# Patient Record
Sex: MALE | Race: OTHER
[De-identification: names, ages, dates, MRNs, and addresses within clinical notes are randomized per-mention and may not be internally consistent; named-entity substitution may affect disease eponyms.]

---

## 2017-03-02 ENCOUNTER — HOSPITAL ENCOUNTER (OUTPATIENT)
Dept: HOSPITAL 53 - M SMT | Age: 77
End: 2017-03-02
Attending: NURSE PRACTITIONER
Payer: MEDICARE

## 2017-03-02 DIAGNOSIS — R97.20: Primary | ICD-10-CM

## 2017-03-04 LAB — PSA SERPL-MCNC: 2.9 NG/ML (ref 0–4)

## 2017-08-25 ENCOUNTER — HOSPITAL ENCOUNTER (OUTPATIENT)
Dept: HOSPITAL 53 - M SMT | Age: 77
End: 2017-08-25
Attending: NURSE PRACTITIONER
Payer: MEDICARE

## 2017-08-25 DIAGNOSIS — R97.20: Primary | ICD-10-CM

## 2017-10-27 ENCOUNTER — HOSPITAL ENCOUNTER (OUTPATIENT)
Dept: HOSPITAL 53 - M SMT PRO | Age: 77
End: 2017-10-27
Attending: UROLOGY
Payer: MEDICARE

## 2017-10-27 DIAGNOSIS — C61: Primary | ICD-10-CM

## 2017-10-27 PROCEDURE — 76942 ECHO GUIDE FOR BIOPSY: CPT

## 2017-10-27 PROCEDURE — 55700: CPT

## 2017-10-27 PROCEDURE — 76872 US TRANSRECTAL: CPT

## 2017-10-27 NOTE — REP
TRANSRECTAL ULTRASOUND OF THE PROSTATE WITH ULTRASOUND GUIDANCE FOR PROSTATE

BIOPSY:

 

Real-time sonographic of the prostate performed utilized transrectal probe.  Size

of the gland is 4.5 x 3.8 x 5.0 cm for a total volume of 45.3 mL.  Echotexture is

diffusely heterogeneous.  On the right there are three nodular hypoechoic areas

identified measuring 11 x 8,  7 x 6  and 10 x 9 mm.  On the left two nodular

areas are seen measuring 12 x 9 and 6 x 4 mm.  Seminal vesicles appear

symmetrical.

 

Ultrasound guidance was provided for Dr. Biswas who performed ultrasound

guided biopsy of the prostate.

 

 

Signed by

Rey Knowles MD 10/27/2017 05:49 P

## 2017-11-15 ENCOUNTER — HOSPITAL ENCOUNTER (OUTPATIENT)
Dept: HOSPITAL 53 - M LAB REF | Age: 77
End: 2017-11-15
Attending: SURGERY
Payer: MEDICARE

## 2017-11-15 DIAGNOSIS — C43.59: Primary | ICD-10-CM

## 2017-11-15 DIAGNOSIS — L57.0: ICD-10-CM

## 2017-11-15 DIAGNOSIS — C44.519: ICD-10-CM

## 2017-11-28 ENCOUNTER — HOSPITAL ENCOUNTER (INPATIENT)
Dept: HOSPITAL 53 - M OR | Age: 77
LOS: 1 days | Discharge: HOME | DRG: 708 | End: 2017-11-29
Attending: UROLOGY | Admitting: UROLOGY
Payer: MEDICARE

## 2017-11-28 VITALS — SYSTOLIC BLOOD PRESSURE: 136 MMHG | DIASTOLIC BLOOD PRESSURE: 78 MMHG

## 2017-11-28 VITALS — SYSTOLIC BLOOD PRESSURE: 131 MMHG | DIASTOLIC BLOOD PRESSURE: 76 MMHG

## 2017-11-28 VITALS — DIASTOLIC BLOOD PRESSURE: 87 MMHG | SYSTOLIC BLOOD PRESSURE: 139 MMHG

## 2017-11-28 VITALS — DIASTOLIC BLOOD PRESSURE: 78 MMHG | SYSTOLIC BLOOD PRESSURE: 164 MMHG

## 2017-11-28 VITALS — HEIGHT: 70 IN | WEIGHT: 184 LBS | BODY MASS INDEX: 26.34 KG/M2

## 2017-11-28 VITALS — DIASTOLIC BLOOD PRESSURE: 73 MMHG | SYSTOLIC BLOOD PRESSURE: 152 MMHG

## 2017-11-28 VITALS — SYSTOLIC BLOOD PRESSURE: 150 MMHG | DIASTOLIC BLOOD PRESSURE: 80 MMHG

## 2017-11-28 DIAGNOSIS — C61: Primary | ICD-10-CM

## 2017-11-28 DIAGNOSIS — E78.5: ICD-10-CM

## 2017-11-28 DIAGNOSIS — Z87.442: ICD-10-CM

## 2017-11-28 DIAGNOSIS — Z88.1: ICD-10-CM

## 2017-11-28 DIAGNOSIS — Z79.899: ICD-10-CM

## 2017-11-28 DIAGNOSIS — Z88.5: ICD-10-CM

## 2017-11-28 DIAGNOSIS — Z87.891: ICD-10-CM

## 2017-11-28 DIAGNOSIS — G43.909: ICD-10-CM

## 2017-11-28 DIAGNOSIS — I10: ICD-10-CM

## 2017-11-28 DIAGNOSIS — Z88.8: ICD-10-CM

## 2017-11-28 LAB
ANION GAP SERPL CALC-SCNC: 7 MEQ/L (ref 8–16)
BUN SERPL-MCNC: 15 MG/DL (ref 7–18)
CALCIUM SERPL-MCNC: 8.4 MG/DL (ref 8.8–10.2)
CHLORIDE SERPL-SCNC: 107 MEQ/L (ref 98–107)
CO2 SERPL-SCNC: 26 MEQ/L (ref 21–32)
CREAT SERPL-MCNC: 1.48 MG/DL (ref 0.7–1.3)
ERYTHROCYTE [DISTWIDTH] IN BLOOD BY AUTOMATED COUNT: 13.5 % (ref 11.5–14.5)
GFR SERPL CREATININE-BSD FRML MDRD: 49.3 ML/MIN/{1.73_M2} (ref 42–?)
GLUCOSE SERPL-MCNC: 142 MG/DL (ref 83–110)
MCH RBC QN AUTO: 34.4 PG (ref 27–33)
MCHC RBC AUTO-ENTMCNC: 33.7 G/DL (ref 32–36.5)
MCV RBC AUTO: 102.1 FL (ref 80–96)
NRBC BLD AUTO-RTO: 0 % (ref 0–0)
PLATELET # BLD AUTO: 230 10^3/UL (ref 150–450)
POTASSIUM SERPL-SCNC: 4.5 MEQ/L (ref 3.5–5.1)
SODIUM SERPL-SCNC: 140 MEQ/L (ref 136–145)
WBC # BLD AUTO: 8.3 10^3/UL (ref 4–10)

## 2017-11-28 PROCEDURE — 8E0W4CZ ROBOTIC ASSISTED PROCEDURE OF TRUNK REGION, PERCUTANEOUS ENDOSCOPIC APPROACH: ICD-10-PCS | Performed by: UROLOGY

## 2017-11-28 PROCEDURE — 07BC4ZZ EXCISION OF PELVIS LYMPHATIC, PERCUTANEOUS ENDOSCOPIC APPROACH: ICD-10-PCS | Performed by: UROLOGY

## 2017-11-28 PROCEDURE — 0VT04ZZ RESECTION OF PROSTATE, PERCUTANEOUS ENDOSCOPIC APPROACH: ICD-10-PCS | Performed by: UROLOGY

## 2017-11-28 RX ADMIN — ACETAMINOPHEN SCH MG: 650 TABLET, FILM COATED, EXTENDED RELEASE ORAL at 21:40

## 2017-11-28 RX ADMIN — FENTANYL CITRATE PRN MCG: 50 INJECTION, SOLUTION INTRAMUSCULAR; INTRAVENOUS at 18:08

## 2017-11-28 RX ADMIN — KETOROLAC TROMETHAMINE SCH MG: 30 INJECTION, SOLUTION INTRAMUSCULAR at 20:11

## 2017-11-28 RX ADMIN — SULFAMETHOXAZOLE AND TRIMETHOPRIM SCH TAB: 800; 160 TABLET ORAL at 21:39

## 2017-11-28 RX ADMIN — POTASSIUM CHLORIDE, DEXTROSE MONOHYDRATE AND SODIUM CHLORIDE SCH MLS/HR: 150; 5; 450 INJECTION, SOLUTION INTRAVENOUS at 20:14

## 2017-11-28 RX ADMIN — FENTANYL CITRATE PRN MCG: 50 INJECTION, SOLUTION INTRAMUSCULAR; INTRAVENOUS at 17:38

## 2017-11-28 RX ADMIN — FENTANYL CITRATE PRN MCG: 50 INJECTION, SOLUTION INTRAMUSCULAR; INTRAVENOUS at 17:58

## 2017-11-28 RX ADMIN — FENTANYL CITRATE PRN MCG: 50 INJECTION, SOLUTION INTRAMUSCULAR; INTRAVENOUS at 17:43

## 2017-11-28 NOTE — RO
DATE OF PROCEDURE:  11/28/2017

 

PREOPERATIVE DIAGNOSIS:  Prostate cancer.

 

POSTOPERATIVE DIAGNOSIS:  Prostate cancer.

 

SURGERY PERFORMED:  Robotic-assisted radical prostatectomy plus bilateral pelvic

lymph node dissections.

 

SURGEON:  Qasim Biswas MD

 

FIRST ASSISTANT:  Jeanette Buckley

 

ANESTHESIA:  General.

 

COMPLICATIONS:  None.

 

ESTIMATED BLOOD LOSS:  75 mL.

 

HISTORY OF PRESENT ILLNESS:  This is a 77-year-old male patient that has a

clinical history of prostate cancer, [place] 7, clinical stage [id number].  He has

consented for robotic-assisted radical prostatectomy, plus bilateral pelvic lymph

node dissections.

 

PROCEDURE DESCRIPTION:  In a patient under general anesthesia in supine position,

modified position, after prepping and draping the area of concern, which included

the entire genitalia and abdomen, we introduced a #16-Ghanaian Castro catheter

filled the balloon to 10 mL and placed it to gravity. We then performed an

incision infraumbilically for about 2 cm in length, vertically in the midline.

Through this incision, we opened the aponeurosis of the rectal fascia for about 2

cm and with finger dissection we dissected the Retzius space. We then placed a

balloon SpaceMaker to dissect further on the Retzius space. We then placed a 12

mm balloon trocar and inflated the balloon to 40 mL and placed CO2 at a maximum

pressure of 15 on high flow. We placed a hand-held robotic camera 0 degrees, to

place the other trocars in a fan-shape manner. Two 8 mm metallic trocars

 by each other by 8 cm were placed on the right side and two other

trocars, a 12 mm VersaStep  in a midclavicular line, 16 cm away from the

midclavicular line and 8 cm away from this one another trocar on the left side an

8 mm metallic trocar. We then positioned the patient in steep Trendelenburg

position and docked the robot.

 

On the left arm we used monopolar scissors and on the right arm we used  a

ProGrasp and bipolar PK.   We then proceeded to actually dissect the

periprostatic fat, uncovered the prostate and identified the endopelvic fascia

and opened the endopelvic fascia from base to apex. We cut the puboprostatic

ligaments and then secured the dorsal vein complex with CT-1 needle #0 Vicryl

times two. We then proceeded to cut the anterior bladder neck with Monopolar

scissors and then opened the bladder and identified the Castro catheter, deflated

the balloon and grabbed the Castro catheter with the third arm and pulled the

prostate into traction anteriorly by pulling on the Castro catheter with the third

arm. We then cut the posterior bladder neck, posterior to the detrusor muscle and

then dissected the vas deferens and cut them and dissected the seminal vesicles

bilaterally and grabbed them with the third arm to pull into traction the

posterior Denonvilliers fascia. We then cut the posterior Denonvilliers fascia

and dissected the rectum away from the prostate gland from base to apex. With

extra-large Hem-o-rachel clipper pliers we actually secured the prostatic pedicles

on the left side times two, on the right side times two. In an anterior fascial

approach we dissected the prostate from base to apex. We then proceeded to cut

the dorsal vein complex, cut the urethra and place the prostate and seminal

vesicles into a 10 mm Endo Catch bag. We then proceeded to actually do a

posterior Denonvillier reconstruction, Clifton stitch with a V-Loc #3-0 in a

running fashion. We then proceeded to do our urethrovesical anastomosis with a

double-arm absorbable barbed suture Quill stitch, #2-0. We started at 6-o'clock

in the bladder neck, outside in and inside out and urethra running 360 degrees

across around the bladder neck and placed 20 mL Castro catheter inflatable balloon

to 20 ml, tied the knots, took the needles out. We tested our anastomosis and was

water-tight.

 

We then proceeded to do our pelvic lymph node dissections on the left side and

the right side. Our limits of dissection were superiorly the hypogastric

bifurcation with hypogastric artery, laterally the iliac artery, posteriorly the

pelvic sidewall, medially the bladder and inferiorly the lacunar ligament. We

dissected the external iliac vein lymph nodes, obturator lymph nodes, hypogastric

lymph nodes. We secured hemostasis with Hem-o-loks and bipolar PK. We performed

the same dissection on the right side. Each specimen was sent into permanent

pathology analysis in a 10 mm Endo Catch bag labeled as left pelvic lymph node

dissection, right pelvic lymph node dissection.

 

We then proceeded to take out the third arm and placed a round DONY drain, #15

blade and placed into the pelvis. We then proceeded undock the robot take all the

instruments out, undock the robot and take all the trocars out. We took the

specimens through the midline incision, prostate and seminal vesicles, then left

pelvic lymph node dissection, right pelvic lymph node dissection all sent to

permanent pathology analysis.

 

The estimated blood loss was 75 mL.  We then proceeded to close the incision in

the midline with #2-0 Vicryl in a running fashion with UR6 needle and then skin

was closed with #4-0 Monocryl subcuticular stitches. We placed Mastisol,

Steri-Strips, telfa and Tegaderm on top of each incision. The DONY was placed to

bulb suction. The Castro catheter was placed to gravity.

 

PLAN:  The patient will pass through recovery room and then to the floor.  Once

he is tolerating a regular diet, ambulating very well he will be discharged home

with a Castro catheter.

## 2017-11-29 VITALS — DIASTOLIC BLOOD PRESSURE: 76 MMHG | SYSTOLIC BLOOD PRESSURE: 131 MMHG

## 2017-11-29 VITALS — DIASTOLIC BLOOD PRESSURE: 70 MMHG | SYSTOLIC BLOOD PRESSURE: 123 MMHG

## 2017-11-29 VITALS — DIASTOLIC BLOOD PRESSURE: 81 MMHG | SYSTOLIC BLOOD PRESSURE: 144 MMHG

## 2017-11-29 VITALS — SYSTOLIC BLOOD PRESSURE: 127 MMHG | DIASTOLIC BLOOD PRESSURE: 69 MMHG

## 2017-11-29 VITALS — SYSTOLIC BLOOD PRESSURE: 150 MMHG | DIASTOLIC BLOOD PRESSURE: 85 MMHG

## 2017-11-29 LAB
ANION GAP SERPL CALC-SCNC: 5 MEQ/L (ref 8–16)
BUN SERPL-MCNC: 11 MG/DL (ref 7–18)
CALCIUM SERPL-MCNC: 8.2 MG/DL (ref 8.8–10.2)
CHLORIDE SERPL-SCNC: 108 MEQ/L (ref 98–107)
CO2 SERPL-SCNC: 29 MEQ/L (ref 21–32)
CREAT SERPL-MCNC: 1.1 MG/DL (ref 0.7–1.3)
ERYTHROCYTE [DISTWIDTH] IN BLOOD BY AUTOMATED COUNT: 13.7 % (ref 11.5–14.5)
GFR SERPL CREATININE-BSD FRML MDRD: > 60 ML/MIN/{1.73_M2} (ref 42–?)
GLUCOSE SERPL-MCNC: 130 MG/DL (ref 83–110)
MCH RBC QN AUTO: 34.3 PG (ref 27–33)
MCHC RBC AUTO-ENTMCNC: 33.7 G/DL (ref 32–36.5)
MCV RBC AUTO: 101.5 FL (ref 80–96)
NRBC BLD AUTO-RTO: 0 % (ref 0–0)
PLATELET # BLD AUTO: 200 10^3/UL (ref 150–450)
POTASSIUM SERPL-SCNC: 4.6 MEQ/L (ref 3.5–5.1)
SODIUM SERPL-SCNC: 142 MEQ/L (ref 136–145)
WBC # BLD AUTO: 8.2 10^3/UL (ref 4–10)

## 2017-11-29 RX ADMIN — POTASSIUM CHLORIDE, DEXTROSE MONOHYDRATE AND SODIUM CHLORIDE SCH MLS/HR: 150; 5; 450 INJECTION, SOLUTION INTRAVENOUS at 04:14

## 2017-11-29 RX ADMIN — POTASSIUM CHLORIDE, DEXTROSE MONOHYDRATE AND SODIUM CHLORIDE SCH MLS/HR: 150; 5; 450 INJECTION, SOLUTION INTRAVENOUS at 12:30

## 2017-11-29 RX ADMIN — SULFAMETHOXAZOLE AND TRIMETHOPRIM SCH TAB: 800; 160 TABLET ORAL at 09:52

## 2017-11-29 RX ADMIN — KETOROLAC TROMETHAMINE SCH MG: 30 INJECTION, SOLUTION INTRAMUSCULAR at 09:52

## 2017-11-29 RX ADMIN — ACETAMINOPHEN SCH MG: 650 TABLET, FILM COATED, EXTENDED RELEASE ORAL at 05:17

## 2017-11-29 RX ADMIN — KETOROLAC TROMETHAMINE SCH MG: 30 INJECTION, SOLUTION INTRAMUSCULAR at 02:45

## 2017-11-29 RX ADMIN — ACETAMINOPHEN SCH MG: 650 TABLET, FILM COATED, EXTENDED RELEASE ORAL at 14:33

## 2017-11-29 RX ADMIN — POTASSIUM CHLORIDE, DEXTROSE MONOHYDRATE AND SODIUM CHLORIDE SCH MLS/HR: 150; 5; 450 INJECTION, SOLUTION INTRAVENOUS at 17:16

## 2017-11-29 NOTE — DSES
DATE OF ADMISSION:  11/28/2017

DATE OF DISCHARGE:  11/29/2017

 

DATE OF PROCEDURE:  11/28/2017

 

ADMISSION DIAGNOSIS:  Prostate cancer.

DISCHARGE DIAGNOSIS:  Prostate cancer.

 

SURGERY PERFORMED:  Robotic-assisted radical prostatectomy plus bilateral pelvic

lymph node dissections.

 

SURGEON:  Dr. Qasim Biswas.

 

ADMITTING SURGEON:  Dr. Qasim Biswas.

 

DISCHARGE SURGEON:  Dr. Qasim Biswas.

 

HISTORY OF PRESENT ILLNESS:  A 77-year-old male patient who has history of

prostate cancer.  He had consented for robotic-assisted radical prostatectomy

plus bilateral pelvic lymph node dissections.  This surgery was carried out on

11/28/2017.  After this, he was admitted to the hospital as an inpatient.

 

HOSPITALIZATION COURSE:  The patient did very well.  By postoperative day number

one, he was put on a regular diet and ambulating very well.  Pain was controlled

with Tylenol only.  No fever, no chills.  His urine output was adequate and

clear.  His Carlos A-Bennett (DONY) output was minimal, maximum 20 mL per shift. For

this reason, we discontinued the DONY and put a 4 x 4 and paper tape on top of the

incision site of the DONY.

 

We are sending him home today with a Castro catheter to gravity for 10 days.  He

will follow up at [facility] in 10 days for a voiding trial.  He

will go home with antibiotic, Bactrim double strength one tablet by mouth twice a

day for ten days and Tylenol 650 mg extended release one tablet by mouth every 8

hours as needed for pain.  No heavy weightlifting above 20 pounds.  No driving

for 10 days.  He may shower in three days.  Regular diet.  There were no

complications during surgery or hospitalization.

## 2018-01-09 ENCOUNTER — HOSPITAL ENCOUNTER (OUTPATIENT)
Dept: HOSPITAL 53 - M SMT | Age: 78
End: 2018-01-09
Attending: UROLOGY
Payer: MEDICARE

## 2018-01-09 DIAGNOSIS — Z79.899: ICD-10-CM

## 2018-01-09 DIAGNOSIS — Z85.46: Primary | ICD-10-CM

## 2018-01-09 LAB
APPEARANCE, URINE: (no result)
BACTERIA UR QL AUTO: NEGATIVE
BILIRUBIN, URINE AUTO: NEGATIVE
BLOOD, URINE BLOOD: NEGATIVE
GLUCOSE, URINE (UA) AUTO: NEGATIVE MG/DL
KETONE, URINE AUTO: (no result) MG/DL
LEUKOCYTE ESTERASE UR QL STRIP.AUTO: NEGATIVE
MUCUS, URINE: (no result)
NITRITE, URINE AUTO: NEGATIVE
PH,URINE: 5 UNITS (ref 5–9)
PROT UR QL STRIP.AUTO: NEGATIVE MG/DL
RBC, URINE AUTO: 0 /HPF (ref 0–3)
SPECIFIC GRAVITY URINE AUTO: 1.02 (ref 1–1.03)
SQUAMOUS #/AREA URNS AUTO: 0 /HPF (ref 0–6)
UROBILINOGEN, URINE AUTO: 0.2 MG/DL (ref 0–2)
WBC, URINE AUTO: 1 /HPF (ref 0–3)

## 2018-01-09 PROCEDURE — 81001 URINALYSIS AUTO W/SCOPE: CPT

## 2018-10-15 ENCOUNTER — HOSPITAL ENCOUNTER (OUTPATIENT)
Dept: HOSPITAL 53 - M SMT | Age: 78
End: 2018-10-15
Attending: UROLOGY
Payer: MEDICARE

## 2018-10-15 DIAGNOSIS — Z85.46: Primary | ICD-10-CM

## 2018-10-15 LAB — PROSTATIC SPECIFIC AG MONITOR: < 0.01 NG/ML (ref ?–4)

## 2018-10-15 PROCEDURE — 84153 ASSAY OF PSA TOTAL: CPT

## 2020-05-06 ENCOUNTER — HOSPITAL ENCOUNTER (OUTPATIENT)
Dept: HOSPITAL 53 - M LAB REF | Age: 80
End: 2020-05-06
Attending: DERMATOLOGY
Payer: MEDICARE

## 2020-05-06 DIAGNOSIS — L81.4: ICD-10-CM

## 2020-05-06 DIAGNOSIS — L57.0: Primary | ICD-10-CM

## 2020-09-09 ENCOUNTER — HOSPITAL ENCOUNTER (INPATIENT)
Dept: HOSPITAL 53 - M MSPAV | Age: 80
LOS: 2 days | Discharge: HOME | DRG: 379 | End: 2020-09-11
Attending: STUDENT IN AN ORGANIZED HEALTH CARE EDUCATION/TRAINING PROGRAM | Admitting: INTERNAL MEDICINE
Payer: MEDICARE

## 2020-09-09 VITALS — WEIGHT: 188.94 LBS | BODY MASS INDEX: 27.05 KG/M2 | HEIGHT: 70 IN

## 2020-09-09 DIAGNOSIS — I10: ICD-10-CM

## 2020-09-09 DIAGNOSIS — K64.4: ICD-10-CM

## 2020-09-09 DIAGNOSIS — K21.9: ICD-10-CM

## 2020-09-09 DIAGNOSIS — K64.8: ICD-10-CM

## 2020-09-09 DIAGNOSIS — Z85.46: ICD-10-CM

## 2020-09-09 DIAGNOSIS — Z88.1: ICD-10-CM

## 2020-09-09 DIAGNOSIS — K92.1: ICD-10-CM

## 2020-09-09 DIAGNOSIS — Z88.5: ICD-10-CM

## 2020-09-09 DIAGNOSIS — E78.5: ICD-10-CM

## 2020-09-09 DIAGNOSIS — Z88.6: ICD-10-CM

## 2020-09-09 DIAGNOSIS — K31.819: ICD-10-CM

## 2020-09-09 DIAGNOSIS — Z88.8: ICD-10-CM

## 2020-09-09 DIAGNOSIS — Z79.899: ICD-10-CM

## 2020-09-09 DIAGNOSIS — Z85.820: ICD-10-CM

## 2020-09-09 DIAGNOSIS — K55.20: ICD-10-CM

## 2020-09-09 DIAGNOSIS — K57.31: Primary | ICD-10-CM

## 2020-09-10 VITALS — DIASTOLIC BLOOD PRESSURE: 90 MMHG | SYSTOLIC BLOOD PRESSURE: 140 MMHG

## 2020-09-10 VITALS — SYSTOLIC BLOOD PRESSURE: 136 MMHG | DIASTOLIC BLOOD PRESSURE: 75 MMHG

## 2020-09-10 VITALS — SYSTOLIC BLOOD PRESSURE: 148 MMHG | DIASTOLIC BLOOD PRESSURE: 84 MMHG

## 2020-09-10 VITALS — SYSTOLIC BLOOD PRESSURE: 114 MMHG | DIASTOLIC BLOOD PRESSURE: 76 MMHG

## 2020-09-10 VITALS — SYSTOLIC BLOOD PRESSURE: 126 MMHG | DIASTOLIC BLOOD PRESSURE: 64 MMHG

## 2020-09-10 VITALS — DIASTOLIC BLOOD PRESSURE: 76 MMHG | SYSTOLIC BLOOD PRESSURE: 116 MMHG

## 2020-09-10 VITALS — SYSTOLIC BLOOD PRESSURE: 149 MMHG | DIASTOLIC BLOOD PRESSURE: 93 MMHG

## 2020-09-10 VITALS — SYSTOLIC BLOOD PRESSURE: 138 MMHG | DIASTOLIC BLOOD PRESSURE: 80 MMHG

## 2020-09-10 VITALS — SYSTOLIC BLOOD PRESSURE: 147 MMHG | DIASTOLIC BLOOD PRESSURE: 94 MMHG

## 2020-09-10 VITALS — DIASTOLIC BLOOD PRESSURE: 84 MMHG | SYSTOLIC BLOOD PRESSURE: 148 MMHG

## 2020-09-10 VITALS — SYSTOLIC BLOOD PRESSURE: 150 MMHG | DIASTOLIC BLOOD PRESSURE: 93 MMHG

## 2020-09-10 LAB
ALBUMIN SERPL BCG-MCNC: 3.3 GM/DL (ref 3.2–5.2)
ALT SERPL W P-5'-P-CCNC: 25 U/L (ref 12–78)
BILIRUB SERPL-MCNC: 0.5 MG/DL (ref 0.2–1)
BUN SERPL-MCNC: 19 MG/DL (ref 7–18)
CALCIUM SERPL-MCNC: 8 MG/DL (ref 8.8–10.2)
CHLORIDE SERPL-SCNC: 114 MEQ/L (ref 98–107)
CO2 SERPL-SCNC: 25 MEQ/L (ref 21–32)
CREAT SERPL-MCNC: 0.99 MG/DL (ref 0.7–1.3)
GFR SERPL CREATININE-BSD FRML MDRD: > 60 ML/MIN/{1.73_M2} (ref 42–?)
GLUCOSE SERPL-MCNC: 105 MG/DL (ref 70–100)
HCT VFR BLD AUTO: 35.6 % (ref 42–52)
HCT VFR BLD AUTO: 38.2 % (ref 42–52)
HCT VFR BLD AUTO: 39.5 % (ref 42–52)
HGB BLD-MCNC: 11.9 G/DL (ref 13.5–17.5)
HGB BLD-MCNC: 13 G/DL (ref 13.5–17.5)
HGB BLD-MCNC: 13.2 G/DL (ref 13.5–17.5)
MCH RBC QN AUTO: 34.3 PG (ref 27–33)
MCH RBC QN AUTO: 34.8 PG (ref 27–33)
MCH RBC QN AUTO: 34.8 PG (ref 27–33)
MCHC RBC AUTO-ENTMCNC: 33.4 G/DL (ref 32–36.5)
MCHC RBC AUTO-ENTMCNC: 33.4 G/DL (ref 32–36.5)
MCHC RBC AUTO-ENTMCNC: 34 G/DL (ref 32–36.5)
MCV RBC AUTO: 102.1 FL (ref 80–96)
MCV RBC AUTO: 102.6 FL (ref 80–96)
MCV RBC AUTO: 104.1 FL (ref 80–96)
PLATELET # BLD AUTO: 170 10^3/UL (ref 150–450)
PLATELET # BLD AUTO: 193 10^3/UL (ref 150–450)
PLATELET # BLD AUTO: 203 10^3/UL (ref 150–450)
POTASSIUM SERPL-SCNC: 4.2 MEQ/L (ref 3.5–5.1)
PROT SERPL-MCNC: 5.5 GM/DL (ref 6.4–8.2)
RBC # BLD AUTO: 3.42 10^6/UL (ref 4.3–6.1)
RBC # BLD AUTO: 3.74 10^6/UL (ref 4.3–6.1)
RBC # BLD AUTO: 3.85 10^6/UL (ref 4.3–6.1)
SODIUM SERPL-SCNC: 143 MEQ/L (ref 136–145)
WBC # BLD AUTO: 4.4 10^3/UL (ref 4–10)
WBC # BLD AUTO: 5.2 10^3/UL (ref 4–10)
WBC # BLD AUTO: 6.8 10^3/UL (ref 4–10)

## 2020-09-10 PROCEDURE — 30233N1 TRANSFUSION OF NONAUTOLOGOUS RED BLOOD CELLS INTO PERIPHERAL VEIN, PERCUTANEOUS APPROACH: ICD-10-PCS | Performed by: INTERNAL MEDICINE

## 2020-09-10 PROCEDURE — 0W3P8ZZ CONTROL BLEEDING IN GASTROINTESTINAL TRACT, VIA NATURAL OR ARTIFICIAL OPENING ENDOSCOPIC: ICD-10-PCS | Performed by: INTERNAL MEDICINE

## 2020-09-10 RX ADMIN — DEXTROSE MONOHYDRATE SCH MG: 50 INJECTION, SOLUTION INTRAVENOUS at 08:33

## 2020-09-10 RX ADMIN — DEXTROSE MONOHYDRATE SCH MG: 50 INJECTION, SOLUTION INTRAVENOUS at 20:58

## 2020-09-10 NOTE — IPNPDOC
Subjective


Date Seen


The patient was seen on 9/10/20.





Subjective


Chief Complaint/HPI


Mr. Fabian is a 80 year old male who was transferred here from [place] for GI 

bleed. This morning, he was feeling well. He still has dark red watery bowel 

movements, but he tells me that it is lightening up. Otherwise, denies F/C, 

chest pain, dyspnea, abdominal pain, or dysuria. He tell me he does not use 

Naproxen or ibuprofen often, but he does take Excedrin for migraines.


Constitutional:  Denies: Chills, Fever


Eyes:  Denies: Vision change


ENT:  Denies: Head Aches, Sore Throat


Pulmonary:  Denies: Dyspnea


Cardiovascular:  Denies: Chest Pain


Gastrointestinal:  Reports: Diarrhea (From the colon prep); 


   Denies: Nausea, Abdominal Pain


Genitourinary:  Denies: Dysuria





Objective


Physical Examination


General Exam:  Positive: Alert, Cooperative, No Acute Distress


Eye Exam:  Positive: EOMI; 


   Negative: Sclera icteric


ENT Exam:  Positive: Mucous membr. moist/pink


Neck Exam:  Positive: Supple


Chest Exam:  Positive: Clear to auscultation


Heart Exam:  Positive: Rate Normal, Regular Rhythm


Abdomen Exam:  Positive: Normal bowel sounds


Extremity Exam:  Negative: Edema


Skin Exam:  Positive: Other skin issue (Right upper back cyst)


Neuro Exam:  Positive: Normal Gait, Normal Speech, Strength at 5/5 X4 ext


Psych Exam:  Positive: Mental status NL, Mood NL





Assessment /Plan


Assessment


Mr. Fabian is a 80 year old male history of prostate cancer and malignant 

melanoma here with hematochezia. He has not had a colonoscopy before. Plan for 

colonoscopy later today





Plan/VTE


VTE Prophylaxis Ordered?:  Yes





Plan


1. Hematochezia. Denies frequent NSAID use, but does use Excedrin for migraines.

He has not had a colonoscopy before. GI following and recommendations 

appreciated. Plan for colonoscopy later today. Continue PPI and repeat CBC. Last

hemoglobin was 13.2 and stable. 





2. Migraines. No active migraine today. He has about 2 a month. Rizatriptan held

for planned colonoscopy





3. Hypertension. Blood pressure stable. Verapamill held for planned colonoscopy





4. Hyperlipidemia. Simvastatin held for planned colonoscopy





5. DVT ppx. TEDs





VS, I&O, 24H, Fishbone


Vital Signs/I&O





Vital Signs








  Date Time  Temp Pulse Resp B/P (MAP) Pulse Ox O2 Delivery O2 Flow Rate FiO2


 


9/10/20 06:00 97.9 55 18 116/76 (89) 95 Room Air  














I&O- Last 24 Hours up to 6 AM 


 


 9/10/20





 06:00


 


Intake Total 0 ml


 


Output Total 200 ml


 


Balance -200 ml











Laboratory Data


24H LABS


Laboratory Tests 2


9/10/20 01:32: 


Nucleated Red Blood Cells % (auto) 0.0, Anion Gap 4L, Glomerular Filtration Rate

> 60.0, Calcium Level 8.0L, Total Bilirubin 0.5, Aspartate Amino Transf 

(AST/SGOT) 17, Alanine Aminotransferase (ALT/SGPT) 25, Alkaline Phosphatase 74, 

Total Protein 5.5L, Albumin 3.3, Albumin/Globulin Ratio 1.5


9/10/20 09:04: Nucleated Red Blood Cells % (auto) 0.0


CBC/BMP


Laboratory Tests


9/10/20 01:32








9/10/20 09:04

















SAMANTHA SAMAYOA DO               Sep 10, 2020 11:25

## 2020-09-10 NOTE — HPEPDOC
[facility] Medical History & Physical


Date of Admission


Sep 10, 2020


Date of Service:  Sep 10, 2020


Attending Physician:  Courtney Pantoja MD





History and Physical


CHIEF COMPLAINT: GI bleed





HISTORY OF PRESENT ILLNESS: Patient is a 81 y/o Male transferred from [place] 

for LGIB. He states at about 1600 on 2020 he had a loose bloody stool after 

bearing down to  a pack of water bottles. This caused him to go to 

[place] ED where he had 4 more. He denies any other complaints and states he 

feels fine otherwise. At [place] his lab work was unremarkable, but he was 

transferred out of concern that should he deteriorate overnight, there was no 

surgery or GI coverage available for them so he was transferred to [facility] for 

further evaluation. He has never had a colonoscopy, denies hx of CRC, no family 

hx of colon cancer, and denies any constitutional symptoms. On review of his 

home medication list he does take naproxen and excedrin but only on an as needed

basis. 





PAST MEDICAL HISTORY:


Hx of prostate cancer


HTN


Hyperlipidemia


Hx of malignant melanoma


Hx of migraines


GERD





PAST SURGICAL HISTORY:


Robotic assisted prostatectomy


Retinal perforation


melanoma removal





SOCIAL HISTORY:


Never smoker, very occasional beer every 3 months, denies illicit drug use. 

Retired  who lives at home with his wife and dog. Hx of foreign 

travel to the [place] when he was in the [employer], otherwise no deployments. 





FAMILY HISTORY:


Denies family history of colorectal cancer


Suspects father may have  of cancer





ALLERGIES: Please see below.





REVIEW OF SYSTEMS:


Constitutional: Denies fevers, chills, night sweats, or recent unexpected weight

change 


HEENT: Denies Headaches, head trauma, No visual changes or eye pain, denies nose

bleeds, or difficulty swallowing


Cardiovascular: Denies chest pain, palpitations, or orthopnea


Respiratory: Denies cough, wheezing, or shortness of breath


GI: Wilfred nausea, vomiting, abdominal pain, diarrhea or constipation. Admits to 

hematochezia. 


: Denies pain with urination or frequency


Musculoskeletal: Denies joint pain or swelling


Neuro / Psych: Denies muscle weakness or sensory loss


Skin: Denies skin rashes 





HOME MEDICATIONS: Please see below. 





PHYSICAL EXAMINATION:


Vitals (see below)


GENERAL APPEARANCE: Well appearing male sitting upright in bed in no acute 

distress


HEENT: NC, AT, EOMI, no scleral icterus, moist mucous membranes, no pharyngeal 

erythema. 


CARDIOVASCULAR: RRR, normal S1 and S2. No murmurs, gallops, rubs. 


LUNGS: CTAB with full breath sounds, no wheezes, crackles, or rhonchi. 


ABDOMEN: Soft, non-tender, non-distended, bowel sounds present. No 

hepatosplenomegaly. No masses or eccymosis. No CVA tenderness. 


EXTREMITIES: Mild pitting edema below the knees in bilateral LE. 


NEUROLOGICAL: No focal or sensory deficits. CNIII-XII grossly intact. 


PSYCHIATRIC: Pleasant mood and affect. 





LABORATORY DATA: See below.





IMAGING: none





MICROBIOLOGY: Please see below. 





Assessment/Plan:


#. LGIB


-He has had 1 blood bowel movement at home, 4 at [place], and 1 since being 

admitted here (150 cc of bright red blood). Apparently this was less than 

before. Normal vitals, no signs of shock, no need to acutely transfuse at this 

time. 


-H/H stable


-IV protonix BID, NPO 


-typed and screened, 2 units PRBC held 


-F/u CBC Q8hrs, telemetry, Gi consulted, Dr. Dominguez to see patient in AM, 

advised starting bowel prep w/ 2000cc of golytely. 





#. HTN


-Holding verapamil





#. HLD


-Holding Simvastatin





#. GERD


- IV protonix BID





DVT prophylaxis: lilly vicente. 


CODE STATUS: FULL CODE


Dispo: Observation


Diet: NPO





Vital Signs





Vital Signs








  Date Time  Temp Pulse Resp B/P (MAP) Pulse Ox O2 Delivery O2 Flow Rate FiO2


 


9/10/20 00:40 98.6 74 18 140/90 (107) 96 Room Air  











Laboratory Data


Labs 24H


Laboratory Tests 2


9/10/20 01:32: 


CBC/BMP








Home Medications


Scheduled


Ascorbic Acid (Vitamin C) 500 Mg Tab, 500 MG PO DAILY


Cetirizine HCl (Cetirizine HCl) 10 Mg Tablet, 10 MG PO QHS


Cholecalciferol (Vitamin D3) (Vitamin D3) 1,000 Unit Tablet, 2,000 UNITS PO 

DAILY


Multivitamins (Thera M Plus Tablet) 1 Each Tablet, 1 TAB PO DAILY


Omega-3 Fatty Acids/Fish Oil (Fish Oil 1,000 mg Capsule) 1 Each Capsule, 1,000 

MG PO DAILY


Simvastatin (Simvastatin) 20 Mg Tab, 20 MG PO QHS


Verapamil Hcl (Verapamil ER) 240 Mg Tablet.er, 240 MG PO QHS


Vits A,C,E/Lutein/Minerals (Ocuvite with Lutein Tablet) 1 Tab Tab, 1 TAB PO BID





Scheduled PRN


Aspirin/Acetaminophen/Caffeine (Excedrin Migraine Caplet) 1 Each Tablet, 2 TAB 

PO DAILY PRN for MIGRAINE


Naproxen Sodium (Aleve) 220 Mg Tablet, 220 MG PO BID PRN for PAIN


Rizatriptan Benzoate (Maxalt) 10 Mg Tab, 10 MG PO BID PRN for MIGRAINE





Allergies


Coded Allergies:  


     acetaminophen (Verified  Allergy, Intermediate, RASH, 9/10/20)


     cephalexin (Verified  Allergy, Intermediate, RASH, 9/10/20)


     dextromethorphan (Verified  Allergy, Intermediate, RASH, 9/10/20)


     doxylamine (Verified  Allergy, Intermediate, RASH, 9/10/20)


     hydrocodone (Verified  Allergy, Intermediate, RASH, ITCH, 9/10/20)


     moxifloxacin (Verified  Allergy, Intermediate, RASH, 9/10/20)


     nitrofurantoin (Verified  Allergy, Intermediate, RASH , ITCH, 9/10/20)


     oxybutynin (Verified  Allergy, Intermediate, RASH , ITCH, 9/10/20)


     oxycodone (Verified  Allergy, Intermediate, RASH , ITCH, 9/10/20)


     pseudoephedrine (Verified  Allergy, Intermediate, RASH, 9/10/20)





GME ATTESTATION


ATTENDING NOTE


I, Courtney Pantoja , have independently examined this patient and performed my 

own


physical exam, as well as reviewed the documentation and edited where necessary.

I


have discussed in detail with the resident / student the findings and plan of 

treatment


as documented by the resident / student and edited their note. I agree with 

their


findings and treatment plan and have edited their documentation. I will continue

to


follow the patient during this hospital stay











ELMER ENCARNACION DO                 Sep 10, 2020 02:01


Courtney Pantoja MD            Sep 10, 2020 03:39

## 2020-09-10 NOTE — ROOR
________________________________________________________________________________

Patient Name: Vicente Fabian                Procedure Date: 9/10/2020 3:15 PM

MRN: [id number]                          Account Number: [id number]

YOB: 1940               Age: 80

Room: [facility]                            Gender: Male

Note Status: Finalized                 

________________________________________________________________________________

 

Procedure:           Colonoscopy

Indications:         Hematochezia, Rectal bleeding

Providers:           Jovon Dominguez MD

Referring MD:        Piotr Gamino MD

Requesting Provider: 

Medicines:           Monitored Anesthesia Care

Complications:       No immediate complications.

________________________________________________________________________________

Procedure:           Pre-Anesthesia Assessment:

                     - Prior to the procedure, a History and Physical was 

                     performed, and patient medications and allergies were 

                     reviewed. The patient is competent. The risks and 

                     benefits of the procedure and the sedation options and 

                     risks were discussed with the patient. All questions were 

                     answered and informed consent was obtained. Patient 

                     identification and proposed procedure were verified by 

                     the physician, the nurse and the anesthesiologist in the 

                     procedure room. Mental Status Examination: normal. Airway 

                     Examination: normal oropharyngeal airway and neck 

                     mobility. Respiratory Examination: clear to auscultation. 

                     CV Examination: normal. Prophylactic Antibiotics: The 

                     patient does not require prophylactic antibiotics. Prior 

                     Anticoagulants: The patient has taken no previous 

                     anticoagulant or antiplatelet agents. ASA Grade 

                     Assessment: II - A patient with mild systemic disease. 

                     After reviewing the risks and benefits, the patient was 

                     deemed in satisfactory condition to undergo the 

                     procedure. The anesthesia plan was to use monitored 

                     anesthesia care (MAC). Immediately prior to 

                     administration of medications, the patient was 

                     re-assessed for adequacy to receive sedatives. The heart 

                     rate, respiratory rate, oxygen saturations, blood 

                     pressure, adequacy of pulmonary ventilation, and response 

                     to care were monitored throughout the procedure. The 

                     physical status of the patient was re-assessed after the 

                     procedure.

                     The Colonoscope was introduced through the anus and 

                     advanced to the terminal ileum, with identification of 

                     the appendiceal orifice and IC valve. The colonoscopy was 

                     performed without difficulty. The patient tolerated the 

                     procedure well. The quality of the bowel preparation was 

                     fair. The terminal ileum, ileocecal valve, appendiceal 

                     orifice, and rectum were photographed. Scope insertion 

                     time was 3 minutes. Scope withdrawal time was 9 minutes. 

                     The total duration of the procedure was 14 minutes.

                                                                                

Findings:

     The perianal and digital rectal examinations were normal. Pertinent 

     negatives include normal sphincter tone.

     The terminal ileum appeared normal.

     One medium-sized localized angioectasia without bleeding was found in the 

     ascending colon. For hemostasis, one hemostatic clip was successfully 

     placed. There was no bleeding at the end of the procedure.

     Multiple small and large-mouthed diverticula were found from sigmoid to 

     transverse colon. There was evidence of past bleeding from the 

     diverticular opening.

     Non-bleeding external and internal hemorrhoids were found during 

     retroflexion. The hemorrhoids were medium-sized.

                                                                                

Impression:          - Preparation of the colon was fair.

                     - The examined portion of the ileum was normal.

                     - One non-bleeding colonic angioectasia. Clip was placed.

                     - Moderate diverticulosis from sigmoid to transverse 

                     colon. There was evidence of past bleeding from the 

                     diverticular opening.

                     - Non-bleeding external and internal hemorrhoids.

                     - No specimens collected.

Recommendation:      - Patient has a contact number available for emergencies. 

                     The signs and symptoms of potential delayed complications 

                     were discussed with the patient. Return to normal 

                     activities tomorrow. Written discharge instructions were 

                     provided to the patient.

                     - High fiber diet.

                     - Continue present medications.

                     - Miralax 1 capful (17 grams) in 8 ounces of water PO 

                     daily.

                     - Telephone endoscopist if symptomatic.

                     - Return to primary care physician.

                                                                                

 

Jovon Dominguez MD

_______________________

Jovon Dominguez MD

9/10/2020 4:17:05 PM

Electronically signed by Jovon Dominguez MD

Number of Addenda: 0

 

Note Initiated On: 9/10/2020 3:15 PM

Estimated Blood Loss:

     Estimated blood loss was minimal.

## 2020-09-11 VITALS — SYSTOLIC BLOOD PRESSURE: 125 MMHG | DIASTOLIC BLOOD PRESSURE: 75 MMHG

## 2020-09-11 LAB
ALBUMIN SERPL BCG-MCNC: 2.9 GM/DL (ref 3.2–5.2)
ALT SERPL W P-5'-P-CCNC: 19 U/L (ref 12–78)
BILIRUB SERPL-MCNC: 0.8 MG/DL (ref 0.2–1)
BUN SERPL-MCNC: 10 MG/DL (ref 7–18)
CALCIUM SERPL-MCNC: 8 MG/DL (ref 8.8–10.2)
CHLORIDE SERPL-SCNC: 112 MEQ/L (ref 98–107)
CO2 SERPL-SCNC: 26 MEQ/L (ref 21–32)
CREAT SERPL-MCNC: 0.83 MG/DL (ref 0.7–1.3)
GFR SERPL CREATININE-BSD FRML MDRD: > 60 ML/MIN/{1.73_M2} (ref 42–?)
GLUCOSE SERPL-MCNC: 94 MG/DL (ref 70–100)
HCT VFR BLD AUTO: 34.2 % (ref 42–52)
HCT VFR BLD AUTO: 34.3 % (ref 42–52)
HGB BLD-MCNC: 11.6 G/DL (ref 13.5–17.5)
HGB BLD-MCNC: 11.7 G/DL (ref 13.5–17.5)
MCH RBC QN AUTO: 34.8 PG (ref 27–33)
MCH RBC QN AUTO: 34.9 PG (ref 27–33)
MCHC RBC AUTO-ENTMCNC: 33.9 G/DL (ref 32–36.5)
MCHC RBC AUTO-ENTMCNC: 34.1 G/DL (ref 32–36.5)
MCV RBC AUTO: 102.4 FL (ref 80–96)
MCV RBC AUTO: 102.7 FL (ref 80–96)
PLATELET # BLD AUTO: 170 10^3/UL (ref 150–450)
PLATELET # BLD AUTO: 182 10^3/UL (ref 150–450)
POTASSIUM SERPL-SCNC: 4.1 MEQ/L (ref 3.5–5.1)
PROT SERPL-MCNC: 5 GM/DL (ref 6.4–8.2)
RBC # BLD AUTO: 3.33 10^6/UL (ref 4.3–6.1)
RBC # BLD AUTO: 3.35 10^6/UL (ref 4.3–6.1)
SODIUM SERPL-SCNC: 144 MEQ/L (ref 136–145)
WBC # BLD AUTO: 4.1 10^3/UL (ref 4–10)
WBC # BLD AUTO: 4.4 10^3/UL (ref 4–10)

## 2020-09-11 RX ADMIN — DEXTROSE MONOHYDRATE SCH MG: 50 INJECTION, SOLUTION INTRAVENOUS at 08:32

## 2020-09-11 NOTE — DS.PDOC
Discharge Summary


General


Date of Admission


Sep 10, 2020 at 00:43


Date of Discharge


Sep 11, 2020


Attending Physician:  SAMANTHA SAMAYOA DO


Specialist/Consultants Involve


GI, Dr. Dominguez





Discharge Summary


PROCEDURES PERFORMED DURING STAY: Colonoscopy with clip on 9/10/2020





ADMITTING DIAGNOSES: 


1. Lower GI bleed


2. Hypertension


3. Hyperlipidemia


4. GERD





DISCHARGE DIAGNOSES:


1. Lower GI bleed


2. Diverticulosis


3. Migraines


4. Hypertension


5. Hyperlipidemia





COMPLICATIONS/CHIEF COMPLAINT: Gi Bleed.





HISTORY OF PRESENT ILLNESS: Mr. Fabian is a 80 year old male transferred here 

from [place] for LGIB. It started on 9/9/2020 at 1600. He had a loose bloody 

stool after bearing down to  a pack of water bottles. While at [place], 

he had 4 more episodes of hematochezia. He was transferred to [facility] in concern 

that he may deteriorate and would need GI for colonoscopy. 





HOSPITAL COURSE: While here, his hemoglobin was 13 and didn't need a 

transfusion. When I saw him, he denied taking NSAIDs such as ibuprofen or 

naproxen. He does take Excedrin for migraines which occurs about twice a months.

Otherwise, he has never had a colonoscopy before. That morning prior to 

colonoscopy, he was still having bloody bowel movements which I observed. He did

not have any symptoms of anemia. He went to colonoscopy where they found 

diverticulosis that previously bled and a medium sized localized angioectasia 

without active bleeding. He placed one hemostatic clip. GI recommended that he 

start a high fiber diet and start Miralax 1 capful daily. The following day, his

hemoglobin dropped to 11.6, but stabilized around 11.6. He tolerated a solid 

diet. No bowel movements, but he was passing gas. He felt ready for home and was

discharged in the afternoon. 





DISCHARGE MEDICATIONS: Please see below.


 


ALLERGIES: Please see below.





PHYSICAL EXAMINATION ON DISCHARGE:


VITAL SIGNS: Please see below.


GENERAL: Comfortable, in no apparent distress


HEENT: Head normocephalic/atraumatic, EOMI, sclera clear, pupils equal and round


NECK: Supple


CARDIOVASCULAR EXAMINATION: Regular rate and rhythm


RESPIRATORY EXAMINATION: Lungs clear to auscultation bilaterally


ABDOMINAL EXAMINATION: Soft, non-tender, normal bowel sounds


EXTREMITIES: No pitting edema


NEUROLOGICAL EXAMINATION: CN 3-12 grossly intact, no focal deficits 


PSYCHIATRIC EXAMINATION: Normal mood and affect





LABORATORY DATA: Please see below.





PROGNOSIS: Stable





ACTIVITY: [As tolerated].





DIET: High fiber diet





DISCHARGE PLAN: Home





DISPOSITION: 01 Home, Self-Care.





DISCHARGE INSTRUCTIONS:


1. Follow up with your PCP in a week


2. Follow up with GI in a week


3. Do not take NSAIDs. Can take Tylenol for migraines





DISCHARGE CONDITION: [Stable].





Total time spent on discharge planning, discharge summary, and medication 

reconciliation 45 minutes





Vital Signs/I&Os





Vital Signs








  Date Time  Temp Pulse Resp B/P (MAP) Pulse Ox O2 Delivery O2 Flow Rate FiO2


 


9/11/20 06:00 97.5 61 18 125/75 (92) 95 Room Air  














I&O- Last 24 Hours up to 6 AM 


 


 9/11/20





 06:00


 


Intake Total 450 ml


 


Output Total 600 ml


 


Balance -150 ml











Laboratory Data


Labs 24H


Laboratory Tests 2


9/11/20 05:33: 


Nucleated Red Blood Cells % (auto) 0.0, Anion Gap 6L, Glomerular Filtration Rate

 > 60.0, Calcium Level 8.0L, Total Bilirubin 0.8#, Aspartate Amino Transf 

(AST/SGOT) 14, Alanine Aminotransferase (ALT/SGPT) 19, Alkaline Phosphatase 58, 

Total Protein 5.0L, Albumin 2.9L, Albumin/Globulin Ratio 1.4


9/11/20 11:58: 


Nucleated Red Blood Cells % (auto) 0.0, Vitamin B12 Level 395


CBC/BMP


Laboratory Tests


9/11/20 05:33








9/11/20 11:58











Microbiology





Microbiology


9/10/20 Respiratory Virus Panel (PCR) ([facility]) - Final, Complete





Discharge Medications


Scheduled


Ascorbic Acid (Vitamin C) 500 Mg Tab, 500 MG PO DAILY, (Reported)


Cetirizine HCl (Cetirizine HCl) 10 Mg Tablet, 10 MG PO QHS, (Reported)


Cholecalciferol (Vitamin D3) (Vitamin D3) 1,000 Unit Tablet, 2,000 UNITS PO 

DAILY, (Reported)


Multivitamins (Thera M Plus Tablet) 1 Each Tablet, 1 TAB PO DAILY, (Reported)


Omega-3 Fatty Acids/Fish Oil (Fish Oil 1,000 mg Capsule) 1 Each Capsule, 1,000 

MG PO DAILY, (Reported)


Polyethylene Glycol 3350 (Miralax) 119 Gm Powder, 17 GRAM PO DAILY for 

constipation


   dissolve in water 


Simvastatin (Simvastatin) 20 Mg Tab, 20 MG PO QHS, (Reported)


Verapamil Hcl (Verapamil ER) 240 Mg Tablet.er, 240 MG PO QHS, (Reported)


Vits A,C,E/Lutein/Minerals (Ocuvite with Lutein Tablet) 1 Tab Tab, 1 TAB PO BID,

 (Reported)





Scheduled PRN


Rizatriptan Benzoate (Maxalt) 10 Mg Tab, 10 MG PO BID PRN for MIGRAINE, 

(Reported)





Allergies


Coded Allergies:  


     acetaminophen (Verified  Allergy, Intermediate, RASH, 9/10/20)


     cephalexin (Verified  Allergy, Intermediate, RASH, 9/10/20)


     dextromethorphan (Verified  Allergy, Intermediate, RASH, 9/10/20)


     doxylamine (Verified  Allergy, Intermediate, RASH, 9/10/20)


     hydrocodone (Verified  Allergy, Intermediate, RASH, ITCH, 9/10/20)


     moxifloxacin (Verified  Allergy, Intermediate, RASH, 9/10/20)


     nitrofurantoin (Verified  Allergy, Intermediate, RASH , ITCH, 9/10/20)


     oxybutynin (Verified  Allergy, Intermediate, RASH , ITCH, 9/10/20)


     oxycodone (Verified  Allergy, Intermediate, RASH , ITCH, 9/10/20)


     pseudoephedrine (Verified  Allergy, Intermediate, RASH, 9/10/20)











SAMANTHA SAMAYOA DO               Sep 11, 2020 18:49

## 2020-11-10 ENCOUNTER — HOSPITAL ENCOUNTER (OUTPATIENT)
Dept: HOSPITAL 53 - M LAB REF | Age: 80
End: 2020-11-10
Attending: DERMATOLOGY
Payer: MEDICARE

## 2020-11-10 DIAGNOSIS — D36.17: Primary | ICD-10-CM

## 2020-11-14 ENCOUNTER — HOSPITAL ENCOUNTER (OUTPATIENT)
Dept: HOSPITAL 53 - M LABSMTC | Age: 80
End: 2020-11-14
Attending: ANESTHESIOLOGY
Payer: MEDICARE

## 2020-11-14 DIAGNOSIS — Z01.812: Primary | ICD-10-CM

## 2020-11-14 DIAGNOSIS — Z20.828: ICD-10-CM

## 2020-11-19 ENCOUNTER — HOSPITAL ENCOUNTER (OUTPATIENT)
Dept: HOSPITAL 53 - M SDC | Age: 80
Discharge: HOME | End: 2020-11-19
Attending: OPHTHALMOLOGY
Payer: MEDICARE

## 2020-11-19 VITALS — HEIGHT: 70 IN | BODY MASS INDEX: 27.03 KG/M2 | WEIGHT: 188.8 LBS

## 2020-11-19 VITALS — SYSTOLIC BLOOD PRESSURE: 135 MMHG | DIASTOLIC BLOOD PRESSURE: 80 MMHG

## 2020-11-19 DIAGNOSIS — Z87.891: ICD-10-CM

## 2020-11-19 DIAGNOSIS — Z88.1: ICD-10-CM

## 2020-11-19 DIAGNOSIS — G43.909: ICD-10-CM

## 2020-11-19 DIAGNOSIS — M12.9: ICD-10-CM

## 2020-11-19 DIAGNOSIS — E78.5: ICD-10-CM

## 2020-11-19 DIAGNOSIS — I10: ICD-10-CM

## 2020-11-19 DIAGNOSIS — Z88.5: ICD-10-CM

## 2020-11-19 DIAGNOSIS — R35.0: ICD-10-CM

## 2020-11-19 DIAGNOSIS — Z88.8: ICD-10-CM

## 2020-11-19 DIAGNOSIS — Z85.46: ICD-10-CM

## 2020-11-19 DIAGNOSIS — Z79.899: ICD-10-CM

## 2020-11-19 DIAGNOSIS — K21.9: ICD-10-CM

## 2020-11-19 DIAGNOSIS — H25.12: Primary | ICD-10-CM

## 2020-11-19 DIAGNOSIS — Z88.6: ICD-10-CM

## 2020-11-19 DIAGNOSIS — K92.2: ICD-10-CM

## 2020-11-19 PROCEDURE — 92015 DETERMINE REFRACTIVE STATE: CPT

## 2020-11-19 PROCEDURE — 66984 XCAPSL CTRC RMVL W/O ECP: CPT

## 2021-03-16 ENCOUNTER — HOSPITAL ENCOUNTER (OUTPATIENT)
Dept: HOSPITAL 53 - M LAB REF | Age: 81
End: 2021-03-16
Attending: DERMATOLOGY
Payer: MEDICARE

## 2021-03-16 DIAGNOSIS — L72.0: Primary | ICD-10-CM

## 2021-04-01 ENCOUNTER — HOSPITAL ENCOUNTER (OUTPATIENT)
Dept: HOSPITAL 53 - M LAB REF | Age: 81
End: 2021-04-01
Attending: DERMATOLOGY
Payer: MEDICARE

## 2021-04-01 DIAGNOSIS — L82.1: Primary | ICD-10-CM

## 2021-06-19 ENCOUNTER — HOSPITAL ENCOUNTER (OUTPATIENT)
Dept: HOSPITAL 53 - M PAT | Age: 81
End: 2021-06-19
Attending: ANESTHESIOLOGY
Payer: MEDICARE

## 2021-06-19 DIAGNOSIS — Z11.52: ICD-10-CM

## 2021-06-19 DIAGNOSIS — Z01.818: Primary | ICD-10-CM

## 2021-06-24 ENCOUNTER — HOSPITAL ENCOUNTER (OUTPATIENT)
Dept: HOSPITAL 53 - M SDC | Age: 81
Discharge: HOME | End: 2021-06-24
Attending: OPHTHALMOLOGY
Payer: MEDICARE

## 2021-06-24 VITALS — SYSTOLIC BLOOD PRESSURE: 166 MMHG | DIASTOLIC BLOOD PRESSURE: 96 MMHG

## 2021-06-24 VITALS — WEIGHT: 187.8 LBS | BODY MASS INDEX: 26.88 KG/M2 | HEIGHT: 70 IN

## 2021-06-24 DIAGNOSIS — Z88.8: ICD-10-CM

## 2021-06-24 DIAGNOSIS — Z88.5: ICD-10-CM

## 2021-06-24 DIAGNOSIS — Z79.899: ICD-10-CM

## 2021-06-24 DIAGNOSIS — Z85.820: ICD-10-CM

## 2021-06-24 DIAGNOSIS — M19.041: ICD-10-CM

## 2021-06-24 DIAGNOSIS — M19.042: ICD-10-CM

## 2021-06-24 DIAGNOSIS — G43.909: ICD-10-CM

## 2021-06-24 DIAGNOSIS — Z85.46: ICD-10-CM

## 2021-06-24 DIAGNOSIS — I10: ICD-10-CM

## 2021-06-24 DIAGNOSIS — H25.11: Primary | ICD-10-CM

## 2021-06-24 DIAGNOSIS — K92.9: ICD-10-CM

## 2021-06-24 DIAGNOSIS — Z88.1: ICD-10-CM

## 2021-06-24 PROCEDURE — 66984 XCAPSL CTRC RMVL W/O ECP: CPT

## 2021-07-01 NOTE — RO
OPERATIVE NOTE



DATE OF OPERATION: 06/24/2021



PREOPERATIVE DIAGNOSIS: 

1. Visually significant nuclear sclerotic cataract, right eye.



POSTOPERATIVE DIAGNOSIS:

1. Visually significant nuclear sclerotic cataract, right eye.



PROCEDURE:

1. Cataract extraction with use of phacoemulsification, and placement of

intraocular lens, AU00T0, 20.0 D, right eye.



SURGEON:  Dave Bowen DO



ASSISTANT:



ANESTHESIA:  Local (Omidria) with MAC. 

COMPLICATIONS:  None

POSTOPERATIVE CONDITION:  Stable

INDICATIONS FOR SURGERY:  

1.  Blurred vision affecting patient's activities of daily living.



DESCRIPTION OF PROCEDURE:

The patient was seen in the preoperative area and properly identified. The

correct operative eye was identified and marked. The patient received topical

anesthetic, antibiotics, and topical dilating drops. The patient was then

transferred to the operating room.



The correct side was re-identified and a time out was performed. The eye was

prepped and draped in a sterile fashion. The eyelids were isolated with

Tegaderm tape and the lids were held open with an adjustable speculum.

A 1.0 mm paracentesis incision was made. Omidria was then injected into the

anterior chamber. Viscoelastic was then injected into the anterior chamber

through the paracentesis. Using a 2.4 mm sharp-tipped keratome, the anterior

chamber was entered via a temporal clear cornea incision.

A continuous curvilinear capsulorrhexis was created with Utrata forceps.

Hydrodissection was performed with BSS on a blunt cannula until the nucleus was

able to rotate freely. The crystalline lens was phacoemulsified and aspirated.

Irrigation/aspiration was used to remove the cortical material

Cohesive viscoelastic was placed into the capsular bag to deepen it. The

implant was placed into the capsular bag and allowed to unfold. Placement was

confirmed by visualizing the anterior capsulorrhexis. Irrigation/aspiration was

used to remove the viscoelastic.

The clear corneal incision was hydrated with BSS on a blunt cannula. The lens

was well positioned. Intracameral antibiotic was injected into the anterior

chamber. The incisions were then tested for leaks and found to be negative. The

eye was then palpated for appropriate pressure and adjusted accordingly with

BSS.

The eyelid speculum was then carefully removed. A shield was placed over the

eye.

The patient tolerated the procedure well and was discharge to the recovery unit

in a stable condition.

## 2023-02-27 ENCOUNTER — HOSPITAL ENCOUNTER (EMERGENCY)
Dept: HOSPITAL 53 - M ED | Age: 83
Discharge: ANOTHER HOSPITAL | End: 2023-02-27
Payer: MEDICARE

## 2023-02-27 VITALS — SYSTOLIC BLOOD PRESSURE: 126 MMHG | DIASTOLIC BLOOD PRESSURE: 75 MMHG

## 2023-02-27 VITALS — HEIGHT: 70 IN | BODY MASS INDEX: 27.46 KG/M2 | WEIGHT: 191.8 LBS

## 2023-02-27 VITALS — DIASTOLIC BLOOD PRESSURE: 61 MMHG | SYSTOLIC BLOOD PRESSURE: 134 MMHG

## 2023-02-27 DIAGNOSIS — Z79.899: ICD-10-CM

## 2023-02-27 DIAGNOSIS — I51.7: ICD-10-CM

## 2023-02-27 DIAGNOSIS — Z88.6: ICD-10-CM

## 2023-02-27 DIAGNOSIS — Z85.46: ICD-10-CM

## 2023-02-27 DIAGNOSIS — Z88.1: ICD-10-CM

## 2023-02-27 DIAGNOSIS — Z88.8: ICD-10-CM

## 2023-02-27 DIAGNOSIS — I71.019: Primary | ICD-10-CM

## 2023-02-27 DIAGNOSIS — R91.8: ICD-10-CM

## 2023-02-27 DIAGNOSIS — I10: ICD-10-CM

## 2023-02-27 DIAGNOSIS — Z88.5: ICD-10-CM

## 2023-02-27 LAB
APTT BLD: 32 SECONDS (ref 24.8–34.2)
BASOPHILS # BLD AUTO: 0 10^3/UL (ref 0–0.2)
BASOPHILS NFR BLD AUTO: 0.4 % (ref 0–1)
CK MB CFR.DF SERPL CALC: 1.69
CK MB SERPL-MCNC: < 1 NG/ML (ref ?–3.6)
CK SERPL-CCNC: 59 U/L (ref 46–171)
EOSINOPHIL # BLD AUTO: 0 10^3/UL (ref 0–0.5)
EOSINOPHIL NFR BLD AUTO: 0.4 % (ref 0–3)
HCT VFR BLD AUTO: 44.1 % (ref 42–52)
HGB BLD-MCNC: 14.8 G/DL (ref 13.5–17.5)
INR PPP: 1.04
LYMPHOCYTES # BLD AUTO: 1.5 10^3/UL (ref 1.5–5)
LYMPHOCYTES NFR BLD AUTO: 31.2 % (ref 24–44)
MCH RBC QN AUTO: 34.3 PG (ref 27–33)
MCHC RBC AUTO-ENTMCNC: 33.6 G/DL (ref 32–36.5)
MCV RBC AUTO: 102.3 FL (ref 80–96)
MONOCYTES # BLD AUTO: 0.5 10^3/UL (ref 0–0.8)
MONOCYTES NFR BLD AUTO: 10.8 % (ref 2–8)
NEUTROPHILS # BLD AUTO: 2.7 10^3/UL (ref 1.5–8.5)
NEUTROPHILS NFR BLD AUTO: 57 % (ref 36–66)
PLATELET # BLD AUTO: 193 10^3/UL (ref 150–450)
PROTHROMBIN TIME: 13.8 SECONDS (ref 12.5–14.5)
RBC # BLD AUTO: 4.31 10^6/UL (ref 4.3–6.1)
RSV RNA NPH QL NAA+PROBE: NEGATIVE
WBC # BLD AUTO: 4.7 10^3/UL (ref 4–10)

## 2023-02-27 PROCEDURE — 84484 ASSAY OF TROPONIN QUANT: CPT

## 2023-02-27 PROCEDURE — 87631 RESP VIRUS 3-5 TARGETS: CPT

## 2023-02-27 PROCEDURE — 71275 CT ANGIOGRAPHY CHEST: CPT

## 2023-02-27 PROCEDURE — 93041 RHYTHM ECG TRACING: CPT

## 2023-02-27 PROCEDURE — 96365 THER/PROPH/DIAG IV INF INIT: CPT

## 2023-02-27 PROCEDURE — 71045 X-RAY EXAM CHEST 1 VIEW: CPT

## 2023-02-27 PROCEDURE — 85730 THROMBOPLASTIN TIME PARTIAL: CPT

## 2023-02-27 PROCEDURE — 74175 CTA ABDOMEN W/CONTRAST: CPT

## 2023-02-27 PROCEDURE — 85610 PROTHROMBIN TIME: CPT

## 2023-02-27 PROCEDURE — 86850 RBC ANTIBODY SCREEN: CPT

## 2023-02-27 PROCEDURE — 96375 TX/PRO/DX INJ NEW DRUG ADDON: CPT

## 2023-02-27 PROCEDURE — 82553 CREATINE MB FRACTION: CPT

## 2023-02-27 PROCEDURE — 82550 ASSAY OF CK (CPK): CPT

## 2023-02-27 PROCEDURE — 94760 N-INVAS EAR/PLS OXIMETRY 1: CPT

## 2023-02-27 PROCEDURE — 86901 BLOOD TYPING SEROLOGIC RH(D): CPT

## 2023-02-27 PROCEDURE — 70450 CT HEAD/BRAIN W/O DYE: CPT

## 2023-02-27 PROCEDURE — 86900 BLOOD TYPING SEROLOGIC ABO: CPT

## 2023-02-27 PROCEDURE — 93005 ELECTROCARDIOGRAM TRACING: CPT

## 2023-02-27 PROCEDURE — 85025 COMPLETE CBC W/AUTO DIFF WBC: CPT

## 2023-02-27 PROCEDURE — 99285 EMERGENCY DEPT VISIT HI MDM: CPT

## 2023-02-27 PROCEDURE — 80047 BASIC METABLC PNL IONIZED CA: CPT

## 2023-02-27 RX ADMIN — Medication SCH MLS/HR: at 12:23

## 2023-02-27 RX ADMIN — Medication SCH MLS/HR: at 13:16
